# Patient Record
Sex: FEMALE | URBAN - METROPOLITAN AREA
[De-identification: names, ages, dates, MRNs, and addresses within clinical notes are randomized per-mention and may not be internally consistent; named-entity substitution may affect disease eponyms.]

---

## 2020-01-24 ENCOUNTER — APPOINTMENT (RX ONLY)
Dept: URBAN - METROPOLITAN AREA CLINIC 108 | Facility: CLINIC | Age: 45
Setting detail: DERMATOLOGY
End: 2020-01-24

## 2020-01-24 DIAGNOSIS — L81.0 POSTINFLAMMATORY HYPERPIGMENTATION: ICD-10-CM

## 2020-01-24 PROCEDURE — 99202 OFFICE O/P NEW SF 15 MIN: CPT

## 2020-01-24 PROCEDURE — ? PRESCRIPTION

## 2020-01-24 PROCEDURE — ? COUNSELING

## 2020-01-24 RX ORDER — HYDROQUINONE 4 %
CREAM (GRAM) TOPICAL
Qty: 1 | Refills: 1 | Status: ERX | COMMUNITY
Start: 2020-01-24

## 2020-01-24 RX ORDER — TRETIONIN 0.5 MG/G
CREAM TOPICAL
Qty: 1 | Refills: 3 | Status: ERX | COMMUNITY
Start: 2020-01-24

## 2020-01-24 RX ADMIN — Medication: at 00:00

## 2020-01-24 RX ADMIN — TRETIONIN: 0.5 CREAM TOPICAL at 00:00

## 2020-01-24 ASSESSMENT — LOCATION SIMPLE DESCRIPTION DERM
LOCATION SIMPLE: RIGHT PRETIBIAL REGION
LOCATION SIMPLE: UPPER LIP
LOCATION SIMPLE: LEFT PRETIBIAL REGION

## 2020-01-24 ASSESSMENT — LOCATION ZONE DERM
LOCATION ZONE: LEG
LOCATION ZONE: LIP

## 2020-01-24 ASSESSMENT — LOCATION DETAILED DESCRIPTION DERM
LOCATION DETAILED: RIGHT PROXIMAL PRETIBIAL REGION
LOCATION DETAILED: LEFT LATERAL DISTAL PRETIBIAL REGION
LOCATION DETAILED: PHILTRUM
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
